# Patient Record
Sex: MALE | Employment: FULL TIME | URBAN - METROPOLITAN AREA
[De-identification: names, ages, dates, MRNs, and addresses within clinical notes are randomized per-mention and may not be internally consistent; named-entity substitution may affect disease eponyms.]

---

## 2021-09-30 ENCOUNTER — APPOINTMENT (OUTPATIENT)
Dept: GENERAL RADIOLOGY | Facility: CLINIC | Age: 51
End: 2021-09-30
Attending: PHYSICIAN ASSISTANT
Payer: OTHER MISCELLANEOUS

## 2021-09-30 ENCOUNTER — HOSPITAL ENCOUNTER (EMERGENCY)
Facility: CLINIC | Age: 51
Discharge: HOME | End: 2021-09-30
Attending: PHYSICIAN ASSISTANT | Admitting: PHYSICIAN ASSISTANT
Payer: OTHER MISCELLANEOUS

## 2021-09-30 VITALS
OXYGEN SATURATION: 98 % | TEMPERATURE: 97.2 F | DIASTOLIC BLOOD PRESSURE: 107 MMHG | SYSTOLIC BLOOD PRESSURE: 152 MMHG | HEART RATE: 78 BPM | RESPIRATION RATE: 16 BRPM
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
## 2021-09-30 DIAGNOSIS — S41.112A LACERATION OF ARM, LEFT, INITIAL ENCOUNTER: ICD-10-CM

## 2021-09-30 PROCEDURE — 250N000011 HC RX IP 250 OP 636: Performed by: PHYSICIAN ASSISTANT

## 2021-09-30 PROCEDURE — 90715 TDAP VACCINE 7 YRS/> IM: CPT | Performed by: PHYSICIAN ASSISTANT

## 2021-09-30 PROCEDURE — 99283 EMERGENCY DEPT VISIT LOW MDM: CPT | Mod: 25

## 2021-09-30 PROCEDURE — 73090 X-RAY EXAM OF FOREARM: CPT | Mod: LT

## 2021-09-30 PROCEDURE — 12002 RPR S/N/AX/GEN/TRNK2.6-7.5CM: CPT

## 2021-09-30 PROCEDURE — 90471 IMMUNIZATION ADMIN: CPT | Performed by: PHYSICIAN ASSISTANT

## 2021-09-30 RX ORDER — LIDOCAINE HYDROCHLORIDE 10 MG/ML
INJECTION, SOLUTION EPIDURAL; INFILTRATION; INTRACAUDAL; PERINEURAL
Status: DISCONTINUED
Start: 2021-09-30 | End: 2021-09-30 | Stop reason: HOSPADM

## 2021-09-30 RX ADMIN — CLOSTRIDIUM TETANI TOXOID ANTIGEN (FORMALDEHYDE INACTIVATED), CORYNEBACTERIUM DIPHTHERIAE TOXOID ANTIGEN (FORMALDEHYDE INACTIVATED), BORDETELLA PERTUSSIS TOXOID ANTIGEN (GLUTARALDEHYDE INACTIVATED), BORDETELLA PERTUSSIS FILAMENTOUS HEMAGGLUTININ ANTIGEN (FORMALDEHYDE INACTIVATED), BORDETELLA PERTUSSIS PERTACTIN ANTIGEN, AND BORDETELLA PERTUSSIS FIMBRIAE 2/3 ANTIGEN 0.5 ML: 5; 2; 2.5; 5; 3; 5 INJECTION, SUSPENSION INTRAMUSCULAR at 12:08

## 2021-09-30 ASSESSMENT — ENCOUNTER SYMPTOMS: WOUND: 1

## 2021-09-30 NOTE — ED TRIAGE NOTES
Pt was carrying a fridge into a home when he bumped into a lamp and it fell, breaking on his L. Arm. Laceration noted to lower L. Arm. Bleeding controlled. CMS intact.

## 2021-09-30 NOTE — ED PROVIDER NOTES
History   Chief Complaint:  Laceration       HPI   A phone   was used obtain the below history as well as to explain diagnosis, plan of treatment, reasons to return to the emergency department and appropriate follow up.    Saroj Santana is a 50 year old male who presents with a laceration. The patient reported he was carrying a fridge into a home at work around 0830 this morning when the fridge accidentally struck a lamp causing it to shatter and the glass to fall onto him resulting in a laceration to his left forearm. He does not recall when his tetanus status was last updated.  The time of the exam, the patient denied numbness or tingling in his arm/fingers, difficulty moving his arm, or any other medical concerns.     Review of Systems   Skin: Positive for wound.   All other systems reviewed and are negative.    Allergies:  The patient has no known drug allergies.    Medications:    The patient is currently on no regular medications.    Past Medical History:    The patient denies any past medical history.    Social History:  Presents to the ED: unaccompaned   Occupation: Delivery expert for [employer]      Physical Exam     Patient Vitals for the past 24 hrs:   BP Temp Temp src Pulse Resp SpO2   09/30/21 1125 -- -- -- -- -- 98 %   09/30/21 1120 -- -- -- -- -- 98 %   09/30/21 1115 (!) 177/115 -- -- 81 -- 99 %   09/30/21 0915 (!) 154/105 97.2  F (36.2  C) Temporal 84 16 98 %       Physical Exam  Vitals signs and nursing note reviewed.   HENT:      Nose: Nose normal. No congestion or rhinorrhea.   Eyes:      General: No scleral icterus.     Extraocular Movements: Extraocular movements intact.   Cardiovascular:      Rate and Rhythm: Regular rhythm. Normal Rate.     Pulses: Normal pulses.      Heart sounds: Normal heart sounds.   Pulmonary:      Effort: Pulmonary effort is normal.      Breath sounds: Normal breath sounds.   Musculoskeletal: Normal range of motion upper extremity at shoulder, elbow,  wrist, and fingers.  Normal flexion and extension, pronation and supination.     Right lower leg: No edema.      Left lower leg: No edema.   Skin:     General: Skin is warm and dry.  An approximately 3.5 cm linear laceration to the left forearm.  Hemostasis achieved.  No deep structure damage appreciated.  No retained foreign body appreciated.  Neurological:      Mental Status: Alert. Speech normal. Responds appropriately to questions.  Sensation symmetric and intact in distal upper extremities.  Psychiatric:         Mood and Affect: Mood normal.         Behavior: Behavior normal.       Emergency Department Course     Imaging:    XR Radius/Ulna PA & LAT, Left:   There is ill-defined mild increased density in the region   of the laceration in the distal forearm which I suspect has to do with   overlying dressing. No defined radiopaque foreign body. No fracture, as per radiology.       Procedures    Laceration Repair        LACERATION:  A simple and superficial clean 3.5 cm laceration.      LOCATION:  Left forearm      FUNCTION:  Distally sensation, circulation, motor and tendon function are intact.      ANESTHESIA:  Local using 1% lidocaine without epinephrine total of 2 mLs      PREPARATION:  Irrigation and Scrubbing with Normal Saline and Shur Clens      DEBRIDEMENT:  wound explored, no foreign body found      CLOSURE:  Wound was closed with One Layer.  Skin closed with 5 x 5.0 Ethylon using interrupted sutures.        Emergency Department Course:    Reviewed:  I reviewed the patient's nursing notes, vitals, past medical history and care everywhere.     Assessments:  1115 I obtained history and examined the patient as noted above.   1235 I rechecked the patient and explained findings.   1242 I performed the laceration repair as noted above.     Interventions:  2008 Adacel 0.5 ml IM     Disposition:  The patient was discharged to home.       Impression & Plan     Medical Decision Making:   Saroj Santana is a  50 year old male presented to the Emergency Department with a laceration.  Given the time of the injury, the wound was felt amenable to primary closure.  After adequate anesthesia was obtained, the wound was thoroughly irrigated and examined.  There is no evidence of muscular, tendon, or bony involvement at this time, nor signs or symptoms of neurovascular compromise.  Additionally, given the mechanism of injury and examination, suspicion for a foreign body was warranted and imaging was done.  There is were negative for retained radiopaque foreign body.  No fracture identified. We discussed appropriate wound care instructions including keeping the wound dry for the first 24-48 hours, followed be gentle cleansing thereafter.  We discussed application of sunscreen to the affected area once scar has formed, to minimize long term scar formation.  Warning signs of infection (erythema, warmth, worsening pain, drainage of pus) were discussed, which should prompt return to the ER for re-evaluation and the patient verbalized understanding.  Will plan for suture removal in 7-10 days.  Patient was encouraged to return to the ER or follow-up with PCP in the meantime should any new or troubling symptoms develop. All questions and concerns were addressed prior to discharge. Tetanus updated today.         Diagnosis:    ICD-10-CM    1. Work related injury  Y99.0    2. Laceration of arm, left, initial encounter  S41.112A        Discharge Medications:  New Prescriptions    No medications on file       Scribe Disclosure:  Emelia CARDOSO, am serving as a scribe at 11:11 AM on 9/30/2021 to document services personally performed by Emma Neves PA-C based on my observations and the provider's statements to me.        Emma Neves PA-C  09/30/21 1315

## 2021-09-30 NOTE — DISCHARGE INSTRUCTIONS
Laceración en un brazo o olman pierna: puntos, grapas o cinta quirúrgica  Olman laceración es un eusebio en la piel. Un eusebio profundo o que está muy abierto puede cerrarse con puntos o grapas para que pueda sanar. Los becker más pequeños pueden cerrarse con cinta quirúrgica o adhesivo cutáneo.   Es posible que se le realicen radiografías si algún objeto pudo angel entrado en la piel a través del eusebio. También puede necesitar la vacuna antitetánica en otis de no estar al día con esta vacuna.   Cuidados en el hogar    Siga las indicaciones del proveedor de atención médica sobre cómo cuidar la lesión.    Lávese las sujey con agua corriente limpia y jabón antes y después de atender la herida. [place] ayuda a prevenir infecciones.    Mantenga la herida limpia y seca. Si le colocaron un apósito y se moja o ensucia, cámbielo. De lo contrario, déjelo puesto diandar las primeras 24 horas y luego cámbielo olman vez por día o según le indiquen.    Si usaron puntos de suturas o grapas, limpie la herida todos los días:  ? Cuando se quite el apósito, lave la giselle afectada con agua y jabón. Use un hisopo de algodón humedecido para aflojar y quitar la himanshu o la costra que pueda angel sobre la herida.  ? Luego de la limpieza, mantenga la herida limpia y seca. Hable con geller proveedor de atención médica antes de colocarse cualquier pomada antibiótica en la herida. Vuelva a ponerse el apósito.    Puede quitárselo para ducharse saida de costumbre olman vez transcurridas 24 horas, ludmila no sumerja la giselle afectada (no nade ni tome roddy de dax) hasta que le hayan quitado los puntos o las grapas.    Si se utilizó olman cinta quirúrgica, mantenga la giselle limpia y seca. Si se humedece, séquela con olman toalla. La cinta quirúrgica se caerá laney.    Siga las instrucciones del proveedor de atención médica para todos los medicamentos que le recete.  ? Es posible que le recete olman crema o pomada con antibiótico para prevenir las infecciones. También es  posible que le recete antibióticos en pastillas. No suspenda el medicamento hasta terminarlo o hasta que el proveedor se lo indique.  ? El proveedor también puede recetarle analgésicos. Siga con exactitud las instrucciones para yolanda estos medicamentos.    Evite las actividades que puedan volver a abrir la herida.    Visitas de control  Asista a los controles con geller proveedor de atención médica según le hayan indicado. La mayoría de las lesiones en la piel sanan en 10 días o menos. Sin embargo, a veces pueden infectarse a pesar de recibir un tratamiento adecuado. Revise diariamente la herida para meme si presenta alguno de los signos de infección que se detallan a continuación. Los puntos y las grapas deben quitarse en un plazo de 7 a 14 días. Si usaron cinta quirúrgica, puede quitarla usted mismo si no se  a los 10 días.    Cuándo buscar atención médica  Llame a geller proveedor de atención médica de inmediato ante cualquiera de las siguientes situaciones:     Sangrado de la herida que no se controla aplicando presión directa.    Signos de infección, saida más dolor en la herida, aumento del enrojecimiento o de la hinchazón de la herida, o pus u olor desagradable provenientes de la herida.    Escalofríos, fiebre de 100.4  F (38  C) o más bonnie, o según le haya indicado geller proveedor de atención médica.    Puntos o grapas que se caen o desprenden, o cinta quirúrgica que se sale antes de los 7 días.    La herida vuelve a abrirse.    Cambios en el color de la herida.    Presenta entumecimiento alrededor de la herida.     Disminuye el movimiento alrededor de la giselle lesionada.    [id number] [employer] Todos los derechos reservados. Esta información no pretende sustituir la atención médica profesional. Sólo geller médico puede diagnosticar y tratar un problema de racheal.      Discharge Instructions  Laceration (Cut)    You were seen today for a laceration (cut).  Your provider examined your laceration for any  problems such a buried foreign body (like glass, a splinter, or gravel), or injury to blood vessels, tendons, and nerves.  Your provider may have also rinsed and/or scrubbed your laceration to help prevent an infection. It may not be possible to find all problems with your laceration on the first visit; occasionally foreign bodies or a tendon injury can go undetected.    Your laceration may have been closed in one of several ways:    No closure: many wounds will heal just fine without closure.    Stitches: regular stitches that require removal.    Staples: skin staples are often used in the scalp/head.    Wound adhesive (glue): skin glue can be used for certain lacerations and doesn t require removal.    Wound strips (aka Butterfly bandages or steri-strips): these are bandages that help to close a wound.    Absorbable stitches:  dissolving  stitches that go away on their own and usually don t require removal.    A small percentage of wounds will develop an infection regardless of how well the wound is cared for. Antibiotics are generally not indicated to prevent an infection so are only given for a small number of high-risk wounds. Some lacerations are too high risk to close, and are left open to heal because closure can increase the likelihood that an infection will develop.    Remember that all lacerations, no matter how expertly repaired, will cause scarring. We consider many factors, techniques, and materials, in our efforts to provide the best possible cosmetic outcome.    Generally, every Emergency Department visit should have a follow-up clinic visit with either a primary or a specialty clinic/provider. Please follow-up as instructed by your emergency provider today.     Return to the Emergency Department right away if:    You have more redness, swelling, pain, drainage (pus), a bad smell, or red streaking from your laceration as these symptoms could indicate an infection.    You have a fever of 100.4 F or  more.    You have bleeding that you cannot stop at home. If your cut starts to bleed, hold pressure on the bleeding area with a clean cloth or put pressure over the bandage.  If the bleeding does not stop after using constant pressure for 30 minutes, you should return to the Emergency Department for further treatment.    An area past the laceration is cool, pale, or blue compared with the other side, or has a slower return of color when squeezed.    Your dressing seems too tight or starts to get uncomfortable or painful. For children, signs of a problem might be irritability or restlessness.    You have loss of normal function or use of an area, such as being unable to straighten or bend a finger normally.    You have a numb area past the laceration.    Return to the Emergency Department or see your regular provider if:    The laceration starts to come open.     You have something coming out of the cut or a feeling that there is something in the laceration.    Your wound will not heal, or keeps breaking open. There can always be glass, wood, dirt or other things in any wound.  They will not always show up, even on x-rays.  If a wound does not heal, this may be why, and it is important to follow-up with your regular provider.    Home Care:    Take your dressing off in 12-24 hours, or as instructed by your provider, to check your laceration. Remove the dressing sooner if it seems too tight or painful, or if it is getting numb, tingly, or pale past the dressing.    Gently wash your laceration 1-2 times daily with clean water and mild soap. It is okay to shower or run clean water over the laceration, but do not let the laceration soak in water (no swimming).    If your laceration was closed with wound adhesive or strips: pat it dry and leave it open to the air. For all other repairs: after you wash your laceration, or at least 2 times a day, apply antibiotic ointment (such as Neosporin  or Bacitracin ) to the  laceration, then cover it with a Band-Aid  or gauze.    Keep the laceration clean. Wear gloves or other protective clothing if you are around dirt.    Follow-up for removal:    If your wound was closed with staples or regular stitches, they need to be removed according to the instructions and timeline specified by your provider today.    If your wound was closed with absorbable ( dissolving ) sutures, they should fall out, dissolve, or not be visible in about one week. If they are still visible, then they should be removed according to the instructions and timeline specified by your provider today.    Scars:  To help minimize scarring:    Wear sunscreen over the healed laceration when out in the sun.    Massage the area regularly once healed.    You may apply Vitamin E to the healed wound.    Wait. Scars improve in appearance over months and years.    If you were given a prescription for medicine here today, be sure to read all of the information (including the package insert) that comes with your prescription.  This will include important information about the medicine, its side effects, and any warnings that you need to know about.  The pharmacist who fills the prescription can provide more information and answer questions you may have about the medicine.  If you have questions or concerns that the pharmacist cannot address, please call or return to the Emergency Department.       Remember that you can always come back to the Emergency Department if you are not able to see your regular provider in the amount of time listed above, if you get any new symptoms, or if there is anything that worries you.

## 2021-10-07 ENCOUNTER — HOSPITAL ENCOUNTER (EMERGENCY)
Facility: CLINIC | Age: 51
Discharge: HOME | End: 2021-10-07
Admitting: EMERGENCY MEDICINE

## 2021-10-07 VITALS
DIASTOLIC BLOOD PRESSURE: 110 MMHG | SYSTOLIC BLOOD PRESSURE: 174 MMHG | TEMPERATURE: 97.1 F | RESPIRATION RATE: 18 BRPM | OXYGEN SATURATION: 99 % | HEART RATE: 66 BPM

## 2021-10-07 PROCEDURE — 999N000104 HC STATISTIC NO CHARGE

## 2021-10-07 NOTE — ED NOTES
5 Sutures removed. Patient tolerated procedure without difficulty. Site remains clean, dry and intact.